# Patient Record
Sex: FEMALE | Race: WHITE | NOT HISPANIC OR LATINO | URBAN - METROPOLITAN AREA
[De-identification: names, ages, dates, MRNs, and addresses within clinical notes are randomized per-mention and may not be internally consistent; named-entity substitution may affect disease eponyms.]

---

## 2019-08-06 PROBLEM — 34713006: Status: ACTIVE | Noted: 2019-08-06

## 2019-08-06 PROBLEM — 40930008: Status: ACTIVE | Noted: 2019-08-06

## 2019-08-06 PROBLEM — 267434003: Status: ACTIVE | Noted: 2019-08-06

## 2019-08-06 PROBLEM — 275978004 SCREENING FOR MALIGNANT NEOPLASM OF COLON: Status: ACTIVE | Noted: 2019-08-06

## 2019-08-06 PROBLEM — 312824007: Status: ACTIVE | Noted: 2019-08-06

## 2020-09-04 ENCOUNTER — OFFICE VISIT (OUTPATIENT)
Dept: URBAN - METROPOLITAN AREA CLINIC 23 | Facility: CLINIC | Age: 60
End: 2020-09-04

## 2020-09-15 ENCOUNTER — WEB ENCOUNTER (OUTPATIENT)
Dept: URBAN - METROPOLITAN AREA CLINIC 23 | Facility: CLINIC | Age: 60
End: 2020-09-15

## 2020-09-17 ENCOUNTER — OFFICE VISIT (OUTPATIENT)
Dept: URBAN - METROPOLITAN AREA CLINIC 12 | Facility: CLINIC | Age: 60
End: 2020-09-17
Payer: COMMERCIAL

## 2020-09-17 DIAGNOSIS — K59.01 CONSTIPATION: ICD-10-CM

## 2020-09-17 PROCEDURE — 99214 OFFICE O/P EST MOD 30 MIN: CPT | Performed by: INTERNAL MEDICINE

## 2020-09-17 PROCEDURE — 3017F COLORECTAL CA SCREEN DOC REV: CPT | Performed by: INTERNAL MEDICINE

## 2020-09-17 PROCEDURE — G9903 PT SCRN TBCO ID AS NON USER: HCPCS | Performed by: INTERNAL MEDICINE

## 2020-09-17 PROCEDURE — G8420 CALC BMI NORM PARAMETERS: HCPCS | Performed by: INTERNAL MEDICINE

## 2020-09-17 PROCEDURE — G8427 DOCREV CUR MEDS BY ELIG CLIN: HCPCS | Performed by: INTERNAL MEDICINE

## 2020-09-17 RX ORDER — SIMVASTATIN 20 MG/1
1 TABLET IN THE EVENING TABLET, FILM COATED ORAL ONCE A DAY
Status: ACTIVE | COMMUNITY

## 2020-09-17 RX ORDER — ZOLPIDEM TARTRATE 5 MG/1
1 TABLET AT BEDTIME TABLET, FILM COATED ORAL ONCE A DAY
Status: ACTIVE | COMMUNITY

## 2020-09-17 RX ORDER — LEVOTHYROXINE SODIUM 75 UG/1
1 TABLET IN THE MORNING ON AN EMPTY STOMACH TABLET ORAL ONCE A DAY
Status: ACTIVE | COMMUNITY

## 2020-09-17 RX ORDER — DULOXETINE HYDROCHLORIDE 60 MG/1
1 CAPSULE CAPSULE, DELAYED RELEASE ORAL ONCE A DAY
Status: ACTIVE | COMMUNITY

## 2020-09-17 NOTE — HPI-TODAY'S VISIT:
60-year-old female presented for evaluation of chronic constipation, she is on Cymbalta she noted has hard stool with pellet like stool, no blood in the stool no nausea no vomiting she has 1 bowel movement every other day. She reports when she takes MiraLAX she feels better, she had colonoscopy in October 2019 which revealed 3 mm polyps, she has a family history of colon cancer, no blood in the stool no weight loss no nausea no vomiting she has been on Cymbalta for anxiety,

## 2023-03-15 ENCOUNTER — LAB OUTSIDE AN ENCOUNTER (OUTPATIENT)
Dept: URBAN - METROPOLITAN AREA CLINIC 111 | Facility: CLINIC | Age: 63
End: 2023-03-15

## 2023-03-15 ENCOUNTER — DASHBOARD ENCOUNTERS (OUTPATIENT)
Age: 63
End: 2023-03-15

## 2023-03-15 ENCOUNTER — OFFICE VISIT (OUTPATIENT)
Dept: URBAN - METROPOLITAN AREA CLINIC 111 | Facility: CLINIC | Age: 63
End: 2023-03-15
Payer: COMMERCIAL

## 2023-03-15 VITALS
HEART RATE: 103 BPM | WEIGHT: 131.6 LBS | BODY MASS INDEX: 24.22 KG/M2 | SYSTOLIC BLOOD PRESSURE: 128 MMHG | HEIGHT: 62 IN | DIASTOLIC BLOOD PRESSURE: 87 MMHG

## 2023-03-15 DIAGNOSIS — R10.2 PELVIC PAIN: ICD-10-CM

## 2023-03-15 DIAGNOSIS — Z86.010 HISTORY OF COLON POLYPS: ICD-10-CM

## 2023-03-15 DIAGNOSIS — Z80.0 FAMILY HISTORY OF COLON CANCER: ICD-10-CM

## 2023-03-15 PROBLEM — 428283002 HISTORY OF POLYP OF COLON: Status: ACTIVE | Noted: 2023-03-15

## 2023-03-15 PROCEDURE — 99213 OFFICE O/P EST LOW 20 MIN: CPT | Performed by: INTERNAL MEDICINE

## 2023-03-15 RX ORDER — SIMVASTATIN 20 MG/1
1 TABLET IN THE EVENING TABLET, FILM COATED ORAL ONCE A DAY
Status: ACTIVE | COMMUNITY

## 2023-03-15 RX ORDER — LOSARTAN POTASSIUM 50 MG/1
1 TABLET TABLET ORAL ONCE A DAY
Status: ACTIVE | COMMUNITY

## 2023-03-15 RX ORDER — LEVOTHYROXINE SODIUM 75 UG/1
1 TABLET IN THE MORNING ON AN EMPTY STOMACH TABLET ORAL ONCE A DAY
Status: ACTIVE | COMMUNITY

## 2023-03-15 RX ORDER — DULOXETINE HYDROCHLORIDE 60 MG/1
1 CAPSULE CAPSULE, DELAYED RELEASE ORAL ONCE A DAY
Status: ACTIVE | COMMUNITY

## 2023-03-15 RX ORDER — ZOLPIDEM TARTRATE 5 MG/1
1 TABLET AT BEDTIME TABLET, FILM COATED ORAL ONCE A DAY
Status: ON HOLD | COMMUNITY

## 2023-03-15 NOTE — HPI-TODAY'S VISIT:
62-year-old female presented today for evaluation of pain in the perineal pelvic area radiated to the left hip area pain worse when she move no change in the bowel habit no blood in the stool pain started about 3 months ago.  She was seen by her gynecologist exam was unremarkable was told most likely musculoskeletal pain.  She has ultrasound scheduled next week.  She had family history of colon cancer her last colonoscopy was in 2019 .She has polyp .

## 2023-04-27 ENCOUNTER — OFFICE VISIT (OUTPATIENT)
Dept: URBAN - METROPOLITAN AREA SURGERY CENTER 15 | Facility: SURGERY CENTER | Age: 63
End: 2023-04-27

## 2023-05-12 ENCOUNTER — WEB ENCOUNTER (OUTPATIENT)
Dept: URBAN - METROPOLITAN AREA SURGERY CENTER 15 | Facility: SURGERY CENTER | Age: 63
End: 2023-05-12

## 2023-05-18 ENCOUNTER — CLAIMS CREATED FROM THE CLAIM WINDOW (OUTPATIENT)
Dept: URBAN - METROPOLITAN AREA CLINIC 4 | Facility: CLINIC | Age: 63
End: 2023-05-18
Payer: COMMERCIAL

## 2023-05-18 ENCOUNTER — OFFICE VISIT (OUTPATIENT)
Dept: URBAN - METROPOLITAN AREA SURGERY CENTER 15 | Facility: SURGERY CENTER | Age: 63
End: 2023-05-18
Payer: COMMERCIAL

## 2023-05-18 DIAGNOSIS — K63.89 OTHER SPECIFIED DISEASES OF INTESTINE: ICD-10-CM

## 2023-05-18 DIAGNOSIS — K63.5 BENIGN COLON POLYP: ICD-10-CM

## 2023-05-18 DIAGNOSIS — Z86.010 ADENOMAS PERSONAL HISTORY OF COLONIC POLYPS: ICD-10-CM

## 2023-05-18 PROCEDURE — 88305 TISSUE EXAM BY PATHOLOGIST: CPT | Performed by: PATHOLOGY

## 2023-05-18 PROCEDURE — 45380 COLONOSCOPY AND BIOPSY: CPT | Performed by: INTERNAL MEDICINE

## 2023-05-18 PROCEDURE — G8907 PT DOC NO EVENTS ON DISCHARG: HCPCS | Performed by: INTERNAL MEDICINE

## 2023-05-18 RX ORDER — LOSARTAN POTASSIUM 50 MG/1
1 TABLET TABLET ORAL ONCE A DAY
Status: ACTIVE | COMMUNITY

## 2023-05-18 RX ORDER — ZOLPIDEM TARTRATE 5 MG/1
1 TABLET AT BEDTIME TABLET, FILM COATED ORAL ONCE A DAY
Status: ON HOLD | COMMUNITY

## 2023-05-18 RX ORDER — DULOXETINE HYDROCHLORIDE 60 MG/1
1 CAPSULE CAPSULE, DELAYED RELEASE ORAL ONCE A DAY
Status: ACTIVE | COMMUNITY

## 2023-05-18 RX ORDER — LEVOTHYROXINE SODIUM 75 UG/1
1 TABLET IN THE MORNING ON AN EMPTY STOMACH TABLET ORAL ONCE A DAY
Status: ACTIVE | COMMUNITY

## 2023-05-18 RX ORDER — SIMVASTATIN 20 MG/1
1 TABLET IN THE EVENING TABLET, FILM COATED ORAL ONCE A DAY
Status: ACTIVE | COMMUNITY

## 2024-06-27 ENCOUNTER — OFFICE VISIT (OUTPATIENT)
Dept: URBAN - METROPOLITAN AREA CLINIC 12 | Facility: CLINIC | Age: 64
End: 2024-06-27

## 2024-10-10 ENCOUNTER — OFFICE VISIT (OUTPATIENT)
Dept: URBAN - METROPOLITAN AREA CLINIC 111 | Facility: CLINIC | Age: 64
End: 2024-10-10

## 2024-10-10 NOTE — HPI-TODAY'S VISIT:
65 y/o female here with heartburn. Seen in 3/2023 by Dr. Nava for h/o colon polyps, FH of colon cancer, and pelvic pain. S/p colonoscopy in 5/2023 revealing 1 polyp and internal hemorrhoids. Path revealing hyperplastic polyp. Repeat colon recommended in 5 years.

## 2024-11-13 ENCOUNTER — OFFICE VISIT (OUTPATIENT)
Dept: URBAN - METROPOLITAN AREA CLINIC 111 | Facility: CLINIC | Age: 64
End: 2024-11-13
Payer: MEDICARE

## 2024-11-13 VITALS
HEART RATE: 90 BPM | DIASTOLIC BLOOD PRESSURE: 78 MMHG | HEIGHT: 62 IN | TEMPERATURE: 98.1 F | BODY MASS INDEX: 24.29 KG/M2 | SYSTOLIC BLOOD PRESSURE: 114 MMHG | WEIGHT: 132 LBS

## 2024-11-13 DIAGNOSIS — K21.9 GERD: ICD-10-CM

## 2024-11-13 DIAGNOSIS — Z80.0 FAMILY HISTORY OF COLON CANCER: ICD-10-CM

## 2024-11-13 DIAGNOSIS — Z86.0100 HISTORY OF COLON POLYPS: ICD-10-CM

## 2024-11-13 PROCEDURE — 99214 OFFICE O/P EST MOD 30 MIN: CPT | Performed by: PHYSICIAN ASSISTANT

## 2024-11-13 RX ORDER — LEVOTHYROXINE SODIUM 75 UG/1
1 TABLET IN THE MORNING ON AN EMPTY STOMACH TABLET ORAL ONCE A DAY
Status: ACTIVE | COMMUNITY

## 2024-11-13 RX ORDER — SIMVASTATIN 20 MG/1
1 TABLET IN THE EVENING TABLET, FILM COATED ORAL ONCE A DAY
Status: ACTIVE | COMMUNITY

## 2024-11-13 RX ORDER — LOSARTAN POTASSIUM 50 MG/1
1 TABLET TABLET ORAL ONCE A DAY
Status: ACTIVE | COMMUNITY

## 2024-11-13 RX ORDER — ZOLPIDEM TARTRATE 5 MG/1
1 TABLET AT BEDTIME TABLET, FILM COATED ORAL ONCE A DAY
Status: ON HOLD | COMMUNITY

## 2024-11-13 RX ORDER — DULOXETINE HYDROCHLORIDE 60 MG/1
1 CAPSULE CAPSULE, DELAYED RELEASE ORAL ONCE A DAY
Status: ACTIVE | COMMUNITY

## 2024-11-13 RX ORDER — PANTOPRAZOLE SODIUM 20 MG/1
1 TABLET TABLET, DELAYED RELEASE ORAL ONCE A DAY
Qty: 30 | Refills: 1 | OUTPATIENT
Start: 2024-11-13

## 2024-11-13 NOTE — HPI-TODAY'S VISIT:
65 y/o female here with acid reflux. Seen in 3/2023 by Dr. Nava for h/o colon polyps, FH of colon cancer, and pelvic pain. Pt had colonoscopy in 5/2023 revealing small polyp in sigmoid and small internal hemorrhoids. Path revealing hyperplastic polyp. Repeat colon recommended in 5 years.  She has had acid reflux occasionally before and has just taken Tums. She reports her family lost a baby in August and she has not been feeling well since. She thinks symptoms have been from stress. She has been burping more than normal and feeling acid in her mouth at times. She has had heartburn. She has also felt a little tightness when swallowing. No food is getting stuck. She reports symptoms have been improving. Symptoms have been worse at dinner. She has not been woken up at night. She is normally constipated but she had diarrhea after the baby passed away. She is having a stool about every other day now which is more normal for her. No rectal bleeding. No other FH of GI cancer other than colon cancer. No weight loss. No abdominal pain. No N/V. Other than Tums she has taken some supplements/probiotics that she thinks have helped.